# Patient Record
Sex: MALE
[De-identification: names, ages, dates, MRNs, and addresses within clinical notes are randomized per-mention and may not be internally consistent; named-entity substitution may affect disease eponyms.]

---

## 2020-11-17 ENCOUNTER — DASHBOARD ENCOUNTERS (OUTPATIENT)
Age: 55
End: 2020-11-17

## 2020-11-17 ENCOUNTER — WEB ENCOUNTER (OUTPATIENT)
Dept: URBAN - METROPOLITAN AREA CLINIC 128 | Facility: CLINIC | Age: 55
End: 2020-11-17

## 2020-11-17 ENCOUNTER — OFFICE VISIT (OUTPATIENT)
Dept: URBAN - METROPOLITAN AREA CLINIC 128 | Facility: CLINIC | Age: 55
End: 2020-11-17
Payer: COMMERCIAL

## 2020-11-17 DIAGNOSIS — K62.5 RECTAL BLEEDING: ICD-10-CM

## 2020-11-17 DIAGNOSIS — K64.9 HEMORRHOID: ICD-10-CM

## 2020-11-17 PROCEDURE — 3017F COLORECTAL CA SCREEN DOC REV: CPT | Performed by: PHYSICIAN ASSISTANT

## 2020-11-17 PROCEDURE — 99203 OFFICE O/P NEW LOW 30 MIN: CPT | Performed by: PHYSICIAN ASSISTANT

## 2020-11-17 PROCEDURE — G8483 FLU IMM NO ADMIN DOC REA: HCPCS | Performed by: PHYSICIAN ASSISTANT

## 2020-11-17 PROCEDURE — G8420 CALC BMI NORM PARAMETERS: HCPCS | Performed by: PHYSICIAN ASSISTANT

## 2020-11-17 PROCEDURE — G8427 DOCREV CUR MEDS BY ELIG CLIN: HCPCS | Performed by: PHYSICIAN ASSISTANT

## 2020-11-17 PROCEDURE — 4004F PT TOBACCO SCREEN RCVD TLK: CPT | Performed by: PHYSICIAN ASSISTANT

## 2020-11-17 RX ORDER — POLYETHYLENE GLYOCOL 3350, SODIUM CHLORIDE, SODIUM BICARBONATE AND POTASSIUM CHLORIDE 420; 11.2; 5.72; 1.48 G/4L; G/4L; G/4L; G/4L
SPLIT DOSE REGIMEN: TAKE ONE HALF OF PREP PO AT 5PM AND TAKE THE OTHER HALF OF PREP PO AT 10PM POWDER, FOR SOLUTION NASOGASTRIC; ORAL
Qty: 1 BOTTLE | Refills: 0 | OUTPATIENT
Start: 2020-11-17

## 2020-11-17 RX ORDER — HYDROCORTISONE 25 MG/G
1 APPLICATION CREAM TOPICAL TWICE A DAY
Qty: 1 TUBE | Refills: 0 | OUTPATIENT

## 2020-11-17 NOTE — HPI-OTHER HISTORIES
The patient presents today due to the following symptoms: bright red blood per rectum in the commode Duration of symptoms: off and on for months. It started 10/2019 Associated symptoms: patient has h/o hemorrhoids What alleviates symptoms: nothing What aggravates symptoms: straining There is no family history of colon polyps or colon cancer.  Last colonoscopy: 10 years ago before he had 15 inches of his colon resected due to diverticulitis at that time. He had 2 episodes of heavy drinking over the past month but has had no melena.

## 2020-11-17 NOTE — PHYSICAL EXAM GASTROINTESTINAL
Abdomen , soft, nontender, nondistended , no guarding or rigidity , no masses palpable , normal bowel sounds , Liver and Spleen , no hepatomegaly presentRectal  , deferred per patient, patient will have colonoscopy done soon

## 2020-11-20 ENCOUNTER — OFFICE VISIT (OUTPATIENT)
Dept: URBAN - METROPOLITAN AREA CLINIC 40 | Facility: CLINIC | Age: 55
End: 2020-11-20

## 2020-12-01 LAB
BASO (ABSOLUTE): 0
BASOS: 1
EOS (ABSOLUTE): 0.1
EOS: 2
FERRITIN, SERUM: 184
HEMATOCRIT: 50.4
HEMATOLOGY COMMENTS:: (no result)
HEMOGLOBIN: 16.8
IMMATURE CELLS: (no result)
IMMATURE GRANS (ABS): 0
IMMATURE GRANULOCYTES: 0
LYMPHS (ABSOLUTE): 1.6
LYMPHS: 25
MCH: 30.7
MCHC: 33.3
MCV: 92
MONOCYTES(ABSOLUTE): 0.7
MONOCYTES: 11
NEUTROPHILS (ABSOLUTE): 4
NEUTROPHILS: 61
NRBC: (no result)
PLATELETS: 179
RBC: 5.48
RDW: 12.9
WBC: 6.4

## 2020-12-15 ENCOUNTER — OFFICE VISIT (OUTPATIENT)
Dept: URBAN - METROPOLITAN AREA SURGERY CENTER 31 | Facility: SURGERY CENTER | Age: 55
End: 2020-12-15
Payer: COMMERCIAL

## 2020-12-15 DIAGNOSIS — K62.5 ANAL BLEEDING: ICD-10-CM

## 2020-12-15 PROCEDURE — G9937 DIG OR SURV COLSCO: HCPCS | Performed by: INTERNAL MEDICINE

## 2020-12-15 PROCEDURE — 45378 DIAGNOSTIC COLONOSCOPY: CPT | Performed by: INTERNAL MEDICINE

## 2020-12-15 PROCEDURE — G8907 PT DOC NO EVENTS ON DISCHARG: HCPCS | Performed by: INTERNAL MEDICINE
